# Patient Record
Sex: FEMALE | Race: WHITE
[De-identification: names, ages, dates, MRNs, and addresses within clinical notes are randomized per-mention and may not be internally consistent; named-entity substitution may affect disease eponyms.]

---

## 2018-04-19 ENCOUNTER — HOSPITAL ENCOUNTER (OUTPATIENT)
Dept: HOSPITAL 43 - DL.SDS | Age: 33
Discharge: HOME | End: 2018-04-19
Attending: OBSTETRICS & GYNECOLOGY
Payer: COMMERCIAL

## 2018-04-19 VITALS — SYSTOLIC BLOOD PRESSURE: 111 MMHG | DIASTOLIC BLOOD PRESSURE: 50 MMHG

## 2018-04-19 DIAGNOSIS — N81.2: ICD-10-CM

## 2018-04-19 DIAGNOSIS — Z79.899: ICD-10-CM

## 2018-04-19 DIAGNOSIS — N92.0: Primary | ICD-10-CM

## 2018-04-19 PROCEDURE — 85027 COMPLETE CBC AUTOMATED: CPT

## 2018-04-19 PROCEDURE — 58120 DILATION AND CURETTAGE: CPT

## 2018-04-19 PROCEDURE — 81025 URINE PREGNANCY TEST: CPT

## 2018-04-19 PROCEDURE — 36415 COLL VENOUS BLD VENIPUNCTURE: CPT

## 2018-04-19 NOTE — OR
DATE:  04/19/2018

 

PREOPERATIVE DIAGNOSIS:  Persistent menorrhagia and significant clotting.

 

OPERATION PERFORMED:  Examination under anesthesia with D and C.

 

POSTOPERATIVE DIAGNOSES:

Persistent menorrhagia and significant clotting with evidence of second-degree

uterine prolapse.

 

COMPLICATIONS:  None.

 

ESTIMATED BLOOD LOSS:  25 mL.

 

ANESTHESIA:  General.

 

DESCRIPTION:  After the induction of satisfactory general anesthesia and with

the vagina and cervix routinely prepped and draped in the dorsal lithotomy

position, the weighted speculum was inserted.  Just before this, the pelvic exam

was done and revealed no cervical lesions.  The uterus was in the ola-pl-ledot

position and was thought to be normal sized.  There were no adnexal masses

detected.  Vaginal canal was negative and vulvar exam also was negative.  Now,

the single-tooth tenaculum was attached to the anterior lip of the cervix and I

did notice a second-degree uterine prolapse present.  The endocervical canal was

now carefully inspected further and the uterus was sounded to 8 cm.  Now, the

endocervical canal was progressively dilated to #8.  Now the curette was

introduced and a moderate amount of tissue was obtained on the curettage.  Great

caution was taken to make sure that all aspects of the endometrial cavity were

carefully and systematically curetted.  Grossly, the tissue appeared within

normal limits.  Once again, the curettage was repeated and extra caution was

taken to make sure that all aspects of the cavity were carefully and

systematically curetted.  All of this tissue was submitted to pathology of

course.  All of the instruments were now removed.  The operative site was

hemostatic.  Estimated blood loss was approximately 25 mL.  Sponge count was

reported as correct.  There were no intraoperative nor immediate postoperative

complications whatsoever.  The patient tolerated the procedure well and went to

the recovery room in good condition.

 

The patient does have evidence of second-degree uterine prolapse as mentioned

above.  Postoperative instructions have also been given to her and she will call

us at once if any questions or problems with fever, excess pain, excess

bleeding, unusual malaise, or any bothersome symptoms whatsoever.  She will

avoid intercourse for approximately 2-3 weeks.  She will see me in the office in

approximately 10-12 days and if she is doing well, she can report to me on the

telephone and does not need to see us in the office.  The patient will monitor

her symptoms closely in the next subsequent months and let us know if any

symptoms seem to persist, change, or worsen of course.

 

DD:  04/19/2018 11:07:34

DT:  04/19/2018 11:48:13

[facility]

Job #:  [id number]

## 2018-07-17 ENCOUNTER — HOSPITAL ENCOUNTER (EMERGENCY)
Dept: HOSPITAL 43 - DL.ED | Age: 33
LOS: 1 days | Discharge: HOME | End: 2018-07-18
Payer: COMMERCIAL

## 2018-07-17 DIAGNOSIS — G43.909: Primary | ICD-10-CM

## 2018-07-17 PROCEDURE — 96374 THER/PROPH/DIAG INJ IV PUSH: CPT

## 2018-07-17 PROCEDURE — 96361 HYDRATE IV INFUSION ADD-ON: CPT

## 2018-07-17 PROCEDURE — 85025 COMPLETE CBC W/AUTO DIFF WBC: CPT

## 2018-07-17 PROCEDURE — 36415 COLL VENOUS BLD VENIPUNCTURE: CPT

## 2018-07-17 PROCEDURE — 99283 EMERGENCY DEPT VISIT LOW MDM: CPT

## 2018-07-17 PROCEDURE — 80053 COMPREHEN METABOLIC PANEL: CPT

## 2018-07-18 VITALS — DIASTOLIC BLOOD PRESSURE: 61 MMHG | SYSTOLIC BLOOD PRESSURE: 132 MMHG

## 2018-07-18 LAB
ANION GAP SERPL CALC-SCNC: 9.6 MMOL/L
CHLORIDE SERPL-SCNC: 107 MMOL/L (ref 101–111)
SODIUM SERPL-SCNC: 138 MMOL/L (ref 135–145)

## 2018-07-18 NOTE — EDM.PDOC
ED HPI GENERAL MEDICAL PROBLEM





- General


Chief Complaint: Headache


Stated Complaint: HEADACHE, FEEL OFF [id number]


Time Seen by Provider: 07/18/18 00:35


Source of Information: Reports: Patient


History Limitations: Reports: No Limitations





- History of Present Illness


INITIAL COMMENTS - FREE TEXT/NARRATIVE: 





Migraine hx, usually every 6 months, experiences aura normally, tonight went to 

bed with headache woke feeling disoriented and blurred vision, some tingling in 

right hand. All previous with headaches, Better now. Took 600mg ibuprofen. 

Nausea earlier better now also. Thinks dehydrated. Some light sensitivity





  ** Headache


Pain Score (Numeric/FACES): 6





- Related Data


 Allergies











Allergy/AdvReac Type Severity Reaction Status Date / Time


 


No Known Allergies Allergy   Verified 07/18/18 00:34











Home Meds: 


 Home Meds





Ibuprofen 600 mg PO ASDIRECTED PRN 02/06/15 [History]


Multivitamin [Multi-Vitamin Daily] 1 tab PO DAILY 04/18/18 [History]











Past Medical History





- Past Health History


Medical/Surgical History: Denies Medical/Surgical History


HEENT History: Reports: None


Cardiovascular History: Reports: None


Respiratory History: Reports: None


Gastrointestinal History: Reports: GERD


Genitourinary History: Reports: None


OB/GYN History: Reports: Pregnancy


Musculoskeletal History: Reports: Back Pain, Chronic


Other Musculoskeletal History: Left breast lump (Feb-2015)


Neurological History: Reports: Migraines


Psychiatric History: Reports: ADD


Endocrine/Metabolic History: Reports: None


Hematologic History: Reports: None


Immunologic History: Reports: None


Oncologic (Cancer) History: Reports: None


Other Oncologic History: lump on left breast


Dermatologic History: Reports: None





- Infectious Disease History


Infectious Disease History: Reports: Chicken Pox





- Past Surgical History


Head Surgeries/Procedures: Reports: None


Other HEENT Surgeries/Procedures: [place] teeth and skin graft to mouth


Respiratory Surgical History: Reports: None


GI Surgical History: Reports: Colonoscopy


Other GI Surgeries/Procedures: has a hernia


Female  Surgical History: Reports: None


Musculoskeletal Surgical History: Reports: None


Oncologic Surgical History: Reports: Biopsy of Breast





Social & Family History





- Family History


Family Medical History: Noncontributory





- Tobacco Use


Smoking Status *Q: Never Smoker





- Caffeine Use


Caffeine Use: Reports: Coffee


Caffeine Use Comment: 2.  cups daily





- Recreational Drug Use


Recreational Drug Use: No





- Living Situation & Occupation


Living situation: Reports: 


Occupation: Employed





ED ROS GENERAL





- Review of Systems


Review Of Systems: ROS reveals no pertinent complaints other than HPI.





- Physical Exam


Exam: See Below


Exam Limited By: No Limitations


General Appearance: Alert, No Apparent Distress, Anxious


Eye Exam: Bilateral Eye: EOMI, PERRL, Other (photosensitive)


Ears: Normal External Exam, Normal TMs


Nose: Normal Inspection


Throat/Mouth: Normal Inspection, Normal Lips, Normal Voice


Head Exam: Atraumatic, Normocephalic


Neck: Normal Inspection, Full Range of Motion, Tender Lateral (muscle tension).

  No: Limited Range of Motion, Tender Midline


Respiratory/Chest: No Respiratory Distress, Lungs Clear


Cardiovascular: Normal Peripheral Pulses, Regular Rate, Rhythm


GI/Abdominal: Normal Bowel Sounds, Soft


Neuro Exam (Abbreviated): Alert, Oriented, Normal Cognition, No Motor/Sensory 

Deficits.  No: Confused, Disoriented, Slow to Respond, Memory Loss Recent Events

, Abnormal Gait, Abnormal Reflexes


Back Exam: Normal Inspection


Extremities: Normal Inspection


Psychiatric: Normal Affect, Normal Mood


Skin Exam: Warm, Dry, Intact, Normal Color





Course





- Vital Signs


Last Recorded V/S: 


 Last Vital Signs











Temp  97.6 F   07/18/18 00:29


 


Pulse  60   07/18/18 00:29


 


Resp  16   07/18/18 00:29


 


BP  132/61   07/18/18 00:29


 


Pulse Ox  100   07/18/18 00:29














- Orders/Labs/Meds


Labs: 


 Laboratory Tests











  07/18/18 07/18/18 Range/Units





  02:20 02:20 


 


WBC  4.8 L   (5.0-10.0)  10^3/uL


 


RBC  3.92 L   (4.2-5.4)  10^6/uL


 


Hgb  12.1  D   (12.0-16.0)  g/dL


 


Hct  36.8 L   (37.0-47.0)  %


 


MCV  93.9   ()  fL


 


MCH  30.9   (27.0-34.0)  pg


 


MCHC  32.9 L   (33.0-35.0)  g/dL


 


Plt Count  235   (150-450)  10^3/uL


 


Neut % (Auto)  46.2   (42.2-75.2)  %


 


Lymph % (Auto)  44.9   (20.5-50.1)  %


 


Mono % (Auto)  7.9   (2-8)  %


 


Eos % (Auto)  0.8 L   (1.0-3.0)  %


 


Baso % (Auto)  0.2   (0.0-1.0)  %


 


Sodium   138  (135-145)  mmol/L


 


Potassium   3.6  (3.6-5.0)  mmol/L


 


Chloride   107  (101-111)  mmol/L


 


Carbon Dioxide   25.0  (21.0-31.0)  mmol/L


 


Anion Gap   9.6  


 


BUN   14  (7-18)  mg/dL


 


Creatinine   0.8  (0.6-1.3)  mg/dL


 


Est Cr Clr Drug Dosing   110.18  mL/min


 


Estimated GFR (MDRD)   > 60  


 


BUN/Creatinine Ratio   17.50  


 


Glucose   86  ()  mg/dL


 


Calcium   9.0  (8.4-10.2)  mg/dl


 


Total Bilirubin   0.5  (0.2-1.0)  mg/dL


 


AST   15  (10-42)  IU/L


 


ALT   15  (10-60)  IU/L


 


Alkaline Phosphatase   27 L  ()  IU/L


 


Total Protein   6.9  (6.7-8.2)  g/dl


 


Albumin   4.4  (3.2-5.5)  g/dl


 


Globulin   2.5  


 


Albumin/Globulin Ratio   1.76  











Meds: 


Medications














Discontinued Medications














Generic Name Dose Route Start Last Admin





  Trade Name Freq  PRN Reason Stop Dose Admin


 


Sodium Chloride  1,000 mls @ 999 mls/hr  07/18/18 02:11  07/18/18 02:24





  Normal Saline  IV  07/18/18 03:11  999 mls/hr





  .BOLUS ONE   Administration





     





     





     





     


 


Ketorolac Tromethamine  15 mg  07/18/18 02:20  07/18/18 02:24





  Toradol  IVPUSH  07/18/18 02:21  15 mg





  ONETIME ONE   Administration





     





     





     





     


 


Ondansetron HCl  4 mg  07/18/18 02:08  





  Zofran  IV  07/18/18 02:09  





  ONETIME ONE   





     





     





     





     














Departure





- Departure


Time of Disposition: 03:04


Disposition: Home, Self-Care 01


Condition: Good


Clinical Impression: 


 Migraine








- Discharge Information


Forms:  ED Department Discharge


Additional Instructions: 


rest


light activity


increase fluid intake


follow up as needed

## 2019-12-07 NOTE — EDM.PDOC
ED HPI GENERAL MEDICAL PROBLEM





- General


Chief Complaint: Abdominal Pain


Stated Complaint: DONT FEEL GOOD


Time Seen by Provider: 12/07/19 22:04


Source of Information: Reports: Patient


History Limitations: Reports: No Limitations





- History of Present Illness


INITIAL COMMENTS - FREE TEXT/NARRATIVE: 





ED ambulatory with c/o mid abdominal pain, some improvement from onset of pain, 

Diarrhea mutiple times this ruel. Nausea at onset none now. No fever or chills. 

Previous cholecystectomy. No similar sx. No other family members ill. 


  ** Abdominal


Pain Score (Numeric/FACES): 5





- Related Data


 Allergies











Allergy/AdvReac Type Severity Reaction Status Date / Time


 


No Known Allergies Allergy   Verified 12/07/19 21:35














Past Medical History





- Past Health History


Medical/Surgical History: Denies Medical/Surgical History


HEENT History: Reports: None


Cardiovascular History: Reports: None


Respiratory History: Reports: None


Gastrointestinal History: Reports: GERD


Genitourinary History: Reports: None


OB/GYN History: Reports: Pregnancy


Musculoskeletal History: Reports: Back Pain, Chronic


Other Musculoskeletal History: Left breast lump (Feb-2015)


Neurological History: Reports: Migraines


Psychiatric History: Reports: ADD


Endocrine/Metabolic History: Reports: None


Hematologic History: Reports: None


Immunologic History: Reports: None


Oncologic (Cancer) History: Reports: None


Other Oncologic History: lump on left breast


Dermatologic History: Reports: None





- Infectious Disease History


Infectious Disease History: Reports: Chicken Pox





- Past Surgical History


Head Surgeries/Procedures: Reports: None


Other HEENT Surgeries/Procedures: [place] teeth and skin graft to mouth


Respiratory Surgical History: Reports: None


GI Surgical History: Reports: Cholecystectomy, Colonoscopy


Other GI Surgeries/Procedures: has a hernia


Female  Surgical History: Reports: None


Musculoskeletal Surgical History: Reports: None


Oncologic Surgical History: Reports: Biopsy of Breast





Social & Family History





- Family History


Family Medical History: Noncontributory





- Tobacco Use


Smoking Status *Q: Former Smoker


Used Tobacco, but Quit: Yes


Month/Year Tobacco Last Used: 0000





- Caffeine Use


Caffeine Use: Reports: Coffee


Caffeine Use Comment: 2.  cups daily





- Recreational Drug Use


Recreational Drug Use: No





- Living Situation & Occupation


Living situation: Reports: 


Occupation: Employed





ED ROS GENERAL





- Review of Systems


Review Of Systems: Comprehensive ROS is negative, except as noted in HPI.





ED EXAM, GI/ABD





- Physical Exam


Exam: See Below


Exam Limited By: No Limitations


General Appearance: Alert, Mild Distress ( appears improved, less discomfort 

than on presentation to ED. )


Eyes: Bilateral: EOMI


Ears: Normal External Exam


Nose: Normal Inspection


Throat/Mouth: Normal Inspection


Head: Atraumatic, Normocephalic


Neck: Normal Inspection


Respiratory/Chest: No Respiratory Distress, Lungs Clear, Normal Breath Sounds


Cardiovascular: Regular Rate, Rhythm


GI/Abdominal Exam: Normal Bowel Sounds, Soft, Non-Tender


Back Exam: Normal Inspection, Full Range of Motion


Extremities: Normal Inspection


Neurological: Alert, Oriented, Normal Cognition


Psychiatric: Normal Affect


Skin Exam: Warm, Dry, Intact, Normal Color.  No: Diaphoretic, Pallor





Course





- Vital Signs


Last Recorded V/S: 


 Last Vital Signs











Temp  96.2 F   12/07/19 21:39


 


Pulse  54 L  12/07/19 21:39


 


Resp  16   12/07/19 21:39


 


BP  119/69   12/07/19 21:39


 


Pulse Ox  100   12/07/19 21:39














- Orders/Labs/Meds


Labs: 


 Laboratory Tests











  12/07/19 12/07/19 12/07/19 Range/Units





  21:48 21:48 21:48 


 


WBC  5.9    (5.0-10.0)  10^3/uL


 


RBC  4.28    (4.2-5.4)  10^6/uL


 


Hgb  13.5    (12.0-16.0)  g/dL


 


Hct  38.9    (37.0-47.0)  %


 


MCV  90.9  D    ()  fL


 


MCH  31.5    (27.0-34.0)  pg


 


MCHC  34.7    (33.0-35.0)  g/dL


 


Plt Count  236    (150-450)  10^3/uL


 


Neut % (Auto)  67.3    (42.2-75.2)  %


 


Lymph % (Auto)  23.6    (20.5-50.1)  %


 


Mono % (Auto)  7.8    (2-8)  %


 


Eos % (Auto)  1.0    (1.0-3.0)  %


 


Baso % (Auto)  0.3    (0.0-1.0)  %


 


Sodium   136   (135-145)  mmol/L


 


Potassium   3.6   (3.6-5.0)  mmol/L


 


Chloride   104   (101-111)  mmol/L


 


Carbon Dioxide   23.0   (21.0-31.0)  mmol/L


 


Anion Gap   12.6   


 


BUN   13   (7-18)  mg/dL


 


Creatinine   0.7   (0.6-1.3)  mg/dL


 


Est Cr Clr Drug Dosing   120.58   mL/min


 


Estimated GFR (MDRD)   > 60   


 


BUN/Creatinine Ratio   18.57   


 


Glucose   101   ()  mg/dL


 


Lactic Acid    1.0  (0.5-2.2)  mmol/L


 


Calcium   8.7   (8.4-10.2)  mg/dl


 


Total Bilirubin   0.7   (0.2-1.0)  mg/dL


 


AST   40   (10-42)  IU/L


 


ALT   30   (10-60)  IU/L


 


Alkaline Phosphatase   35 L   ()  IU/L


 


Total Protein   7.2   (6.7-8.2)  g/dl


 


Albumin   4.5   (3.2-5.5)  g/dl


 


Globulin   2.7   


 


Albumin/Globulin Ratio   1.67   


 


Urine Color     (YELLOW)  


 


Urine Appearance     (CLEAR)  


 


Urine pH     (5.0-9.0)  


 


Ur Specific Gravity     (1.005-1.030)  


 


Urine Protein     (NEGATIVE)  


 


Urine Glucose (UA)     (NEGATIVE)  


 


Urine Ketones     (NEGATIVE)  


 


Urine Occult Blood     (NEGATIVE)  


 


Urine Nitrite     (NEGATIVE)  


 


Urine Bilirubin     (NEGATIVE)  


 


Urine Urobilinogen     (0.2-1.0)  mg/dL


 


Ur Leukocyte Esterase     (NEGATIVE)  


 


Urine RBC     /HPF


 


Urine WBC     (0-5/HPF)  /HPF


 


Ur Epithelial Cells     (NOT SEEN)  /HPF


 


Urine Bacteria     (0-FEW/HPF)  /HPF


 


Urine Mucus     (NOT SEEN)  /LPF


 


Urine HCG, Qual     














  12/07/19 12/07/19 Range/Units





  22:09 22:09 


 


WBC    (5.0-10.0)  10^3/uL


 


RBC    (4.2-5.4)  10^6/uL


 


Hgb    (12.0-16.0)  g/dL


 


Hct    (37.0-47.0)  %


 


MCV    ()  fL


 


MCH    (27.0-34.0)  pg


 


MCHC    (33.0-35.0)  g/dL


 


Plt Count    (150-450)  10^3/uL


 


Neut % (Auto)    (42.2-75.2)  %


 


Lymph % (Auto)    (20.5-50.1)  %


 


Mono % (Auto)    (2-8)  %


 


Eos % (Auto)    (1.0-3.0)  %


 


Baso % (Auto)    (0.0-1.0)  %


 


Sodium    (135-145)  mmol/L


 


Potassium    (3.6-5.0)  mmol/L


 


Chloride    (101-111)  mmol/L


 


Carbon Dioxide    (21.0-31.0)  mmol/L


 


Anion Gap    


 


BUN    (7-18)  mg/dL


 


Creatinine    (0.6-1.3)  mg/dL


 


Est Cr Clr Drug Dosing    mL/min


 


Estimated GFR (MDRD)    


 


BUN/Creatinine Ratio    


 


Glucose    ()  mg/dL


 


Lactic Acid    (0.5-2.2)  mmol/L


 


Calcium    (8.4-10.2)  mg/dl


 


Total Bilirubin    (0.2-1.0)  mg/dL


 


AST    (10-42)  IU/L


 


ALT    (10-60)  IU/L


 


Alkaline Phosphatase    ()  IU/L


 


Total Protein    (6.7-8.2)  g/dl


 


Albumin    (3.2-5.5)  g/dl


 


Globulin    


 


Albumin/Globulin Ratio    


 


Urine Color  Yellow   (YELLOW)  


 


Urine Appearance  Clear   (CLEAR)  


 


Urine pH  6.5   (5.0-9.0)  


 


Ur Specific Gravity  1.025   (1.005-1.030)  


 


Urine Protein  Trace H   (NEGATIVE)  


 


Urine Glucose (UA)  Negative   (NEGATIVE)  


 


Urine Ketones  Negative   (NEGATIVE)  


 


Urine Occult Blood  Trace-intact H   (NEGATIVE)  


 


Urine Nitrite  Negative   (NEGATIVE)  


 


Urine Bilirubin  Negative   (NEGATIVE)  


 


Urine Urobilinogen  1.0   (0.2-1.0)  mg/dL


 


Ur Leukocyte Esterase  Negative   (NEGATIVE)  


 


Urine RBC  5-10 H   /HPF


 


Urine WBC  5-10 H   (0-5/HPF)  /HPF


 


Ur Epithelial Cells  Moderate H   (NOT SEEN)  /HPF


 


Urine Bacteria  Few   (0-FEW/HPF)  /HPF


 


Urine Mucus  Moderate H   (NOT SEEN)  /LPF


 


Urine HCG, Qual   Negative  














- Re-Assessments/Exams


Free Text/Narrative Re-Assessment/Exam: 





12/08/19 02:55


Pain improved 





Departure





- Departure


Time of Disposition: 22:46


Disposition: Home, Self-Care 01


Condition: Good


Clinical Impression: 


 Gastroenteritis





Hematuria


Qualifiers:


 Hematuria type: unspecified type Qualified Code(s): R31.9 - Hematuria, 

unspecified








- Discharge Information


*PRESCRIPTION DRUG MONITORING PROGRAM REVIEWED*: No


*COPY OF PRESCRIPTION DRUG MONITORING REPORT IN PATIENT EZEKIEL: No


Instructions:  Viral Gastroenteritis, Adult


Referrals: 


PCP,None [Ordering Only Provider] - 


Forms:  ED Department Discharge


Additional Instructions: 


recheck urine this week


clear liquid diet advance slowly


follow up if symptoms worsen

## 2020-07-27 NOTE — OR
DATE:  07/27/2020

 

PROCEDURE:  Esophagogastroduodenoscopy and multiple pinch biopsies.

 

INSTRUMENT USED:  GIF-H180 Olympus video panendoscope.

 

PREMEDICATIONS:  No oral or topical anesthesia used.  Fentanyl 100 mcg

intravenous, Versed 2 mg intravenous.

 

The procedure was done under pulse oximetry, BP recording, and cardiac monitor.

 

INDICATION:  The patient with persistent heartburn, unexplained and not

responsive to medical measures, on PPI.  Esophagogastroduodenoscopy is performed

for detection of any active erosive lesions, Aparicio esophagus and/or malignancy

also under consideration, H pylori status to be determined, endoscopic

hemostasis therapy if needed.

 

PROCEDURE IN DETAIL:  The scope was passed with ease.  Adequate visualization of

the esophagus was made from proximal to distal areas.  No upper esophageal

lesions identified.  No distal esophageal stricture.  No uphill or downhill

esophageal varices.  No Lala-Schultz tear.  No evidence of erosive esophagitis

by [place] criteria.  No esophageal polyp or tumor mass identified.  Z-line

was seen at around 40 cm distal to the oral verge, configuration consistent with

class I by ZAP classification.  No proximal gastric varices noted.  Gastric

fundus examination by retroflexion showed no polypoid lesions.  No gastric

ulcer, malignant mass, or vascular ectasia identified.  Duodenal bulb showed no

ulcer.  Visualized second part of the duodenum was unremarkable.  Multiple pinch

biopsies were obtained from the gastric antrum and proximal body and sent for

PyloriTek test for H pylori, and if negative in an hour, the tissue is to be

sent for histopathology.  No bleeding was noted from any of the visualized areas

at the completion of examination.  Photographs were taken of the duodenal bulb,

gastric antrum, fundus, and distal esophagus.

 

IMPRESSION:  Normal study.

 

The patient tolerated the procedure well.

 

DD:  07/27/2020 06:50:32

DT:  07/27/2020 08:21:06

[facility]

Job #:  [id number]